# Patient Record
Sex: MALE | Race: WHITE | NOT HISPANIC OR LATINO | URBAN - METROPOLITAN AREA
[De-identification: names, ages, dates, MRNs, and addresses within clinical notes are randomized per-mention and may not be internally consistent; named-entity substitution may affect disease eponyms.]

---

## 2017-04-19 PROBLEM — Z00.00 ENCOUNTER FOR PREVENTIVE HEALTH EXAMINATION: Status: ACTIVE | Noted: 2017-04-19

## 2017-07-31 ENCOUNTER — EMERGENCY (EMERGENCY)
Facility: HOSPITAL | Age: 32
LOS: 1 days | Discharge: HOME | End: 2017-07-31
Attending: EMERGENCY MEDICINE | Admitting: EMERGENCY MEDICINE
Payer: COMMERCIAL

## 2017-07-31 VITALS
OXYGEN SATURATION: 98 % | HEART RATE: 90 BPM | SYSTOLIC BLOOD PRESSURE: 159 MMHG | RESPIRATION RATE: 18 BRPM | DIASTOLIC BLOOD PRESSURE: 81 MMHG | TEMPERATURE: 99 F

## 2017-07-31 VITALS
DIASTOLIC BLOOD PRESSURE: 84 MMHG | SYSTOLIC BLOOD PRESSURE: 128 MMHG | RESPIRATION RATE: 18 BRPM | TEMPERATURE: 98 F | HEART RATE: 64 BPM | OXYGEN SATURATION: 98 %

## 2017-07-31 PROCEDURE — 99284 EMERGENCY DEPT VISIT MOD MDM: CPT | Mod: 25

## 2017-07-31 PROCEDURE — 96374 THER/PROPH/DIAG INJ IV PUSH: CPT

## 2017-07-31 PROCEDURE — 96375 TX/PRO/DX INJ NEW DRUG ADDON: CPT

## 2017-07-31 RX ORDER — DIPHENHYDRAMINE HCL 50 MG
25 CAPSULE ORAL ONCE
Qty: 0 | Refills: 0 | Status: COMPLETED | OUTPATIENT
Start: 2017-07-31 | End: 2017-07-31

## 2017-07-31 RX ORDER — FAMOTIDINE 10 MG/ML
20 INJECTION INTRAVENOUS ONCE
Qty: 0 | Refills: 0 | Status: COMPLETED | OUTPATIENT
Start: 2017-07-31 | End: 2017-07-31

## 2017-07-31 RX ADMIN — Medication 25 MILLIGRAM(S): at 07:37

## 2017-07-31 RX ADMIN — FAMOTIDINE 20 MILLIGRAM(S): 10 INJECTION INTRAVENOUS at 07:37

## 2017-07-31 RX ADMIN — Medication 125 MILLIGRAM(S): at 07:37

## 2017-07-31 NOTE — ED PROVIDER NOTE - ENMT, MLM
Airway patent, Nasal mucosa clear. Mouth with normal mucosa. Throat has no vesicles, no oropharyngeal exudates and uvula is midline, non erythematous with mild swelling

## 2017-07-31 NOTE — ED PROVIDER NOTE - OBJECTIVE STATEMENT
Patient complaining of pruritic "hives" that started 3 days ago which has been waxing in waning throughout his trunk and extremities. He eat sushi on day of onset but this is not new. He denies a history of allergic reaction, although his children have allergies. He also complains of a transient episode of sensation in his throat around 4am today which prompted his visit. He has been taking benadryl which relieves his pruritis. Otherwise, denies shortness of breath, chest pain, wheezing, stridor, fevers, chills, neck pain, headache, changes in vision, photophobia.

## 2017-07-31 NOTE — ED PROVIDER NOTE - SKIN, MLM
Skin normal color for race, warm, dry and intact. Diffuse blanching target shaped urticaria, some coalesced throughout upper and lower ext, anterior and posterior trunk.

## 2017-07-31 NOTE — ED PROVIDER NOTE - CHPI ED SYMPTOMS NEG
no vomiting/no wheezing/no shortness of breath/no nausea/no throat itching/no swelling of face, tongue/no cough

## 2017-07-31 NOTE — ED PROVIDER NOTE - ATTENDING CONTRIBUTION TO CARE
32y Male complaining of allergic reaction complaining of pruritic "hives" for three days with throat tightness this morning, didn't take anything improved, well appearing, uvula enlarged but not red, erythematous, steroids, h1 and h2 blocker reassess, otherwise nl heent invovlment, mild allergic rash generalized.  lungs cta b/l.

## 2017-07-31 NOTE — ED ADULT NURSE REASSESSMENT NOTE - NS ED NURSE REASSESS COMMENT FT1
pt resting comfortably in stretcher. denies difficulty breathing or trouble swallowing, no respiratory distress, hoarseness, wheezing, or facial swelling present upon assessment. pt medicated with 25 mg of benadryl IVP, 20 mg pepcid IVP and 125 mg of solu medrol, tolerated well. no complaints of pain or discomfort. lungs clear bilaterally. VS stable, afebrile. will reassess pt post medication administration. call bell within reach. comfort measures provided. safety and fall precautions maintained.

## 2017-07-31 NOTE — ED ADULT NURSE NOTE - OBJECTIVE STATEMENT
allergic reaction started on Thursday after eating [place] food in a restaurant; red, itchy rash on arms and legs; took benadryl every day at home but did not seek medical care; last took 25 mg Benadryl po at home one hour prior to arrival; comes in now due to feeling his throat is tighter; no acute resp distress noted

## 2018-07-01 ENCOUNTER — EMERGENCY (EMERGENCY)
Facility: HOSPITAL | Age: 33
LOS: 1 days | Discharge: HOME | End: 2018-07-01
Attending: EMERGENCY MEDICINE | Admitting: EMERGENCY MEDICINE
Payer: COMMERCIAL

## 2018-07-01 VITALS
OXYGEN SATURATION: 100 % | TEMPERATURE: 98 F | HEART RATE: 72 BPM | SYSTOLIC BLOOD PRESSURE: 142 MMHG | DIASTOLIC BLOOD PRESSURE: 73 MMHG | RESPIRATION RATE: 18 BRPM

## 2018-07-01 LAB
ALBUMIN SERPL ELPH-MCNC: 4.8 G/DL — SIGNIFICANT CHANGE UP (ref 3.3–5)
ALP SERPL-CCNC: 77 U/L — SIGNIFICANT CHANGE UP (ref 40–120)
ALT FLD-CCNC: 26 U/L — SIGNIFICANT CHANGE UP (ref 4–41)
AST SERPL-CCNC: 19 U/L — SIGNIFICANT CHANGE UP (ref 4–40)
BASOPHILS # BLD AUTO: 0.02 K/UL — SIGNIFICANT CHANGE UP (ref 0–0.2)
BASOPHILS NFR BLD AUTO: 0.4 % — SIGNIFICANT CHANGE UP (ref 0–2)
BILIRUB SERPL-MCNC: 0.4 MG/DL — SIGNIFICANT CHANGE UP (ref 0.2–1.2)
BUN SERPL-MCNC: 11 MG/DL — SIGNIFICANT CHANGE UP (ref 7–23)
CALCIUM SERPL-MCNC: 9.5 MG/DL — SIGNIFICANT CHANGE UP (ref 8.4–10.5)
CHLORIDE SERPL-SCNC: 101 MMOL/L — SIGNIFICANT CHANGE UP (ref 98–107)
CO2 SERPL-SCNC: 25 MMOL/L — SIGNIFICANT CHANGE UP (ref 22–31)
CREAT SERPL-MCNC: 0.95 MG/DL — SIGNIFICANT CHANGE UP (ref 0.5–1.3)
EOSINOPHIL # BLD AUTO: 0.05 K/UL — SIGNIFICANT CHANGE UP (ref 0–0.5)
EOSINOPHIL NFR BLD AUTO: 1.1 % — SIGNIFICANT CHANGE UP (ref 0–6)
GLUCOSE SERPL-MCNC: 101 MG/DL — HIGH (ref 70–99)
HCT VFR BLD CALC: 43.3 % — SIGNIFICANT CHANGE UP (ref 39–50)
HGB BLD-MCNC: 15.5 G/DL — SIGNIFICANT CHANGE UP (ref 13–17)
IMM GRANULOCYTES # BLD AUTO: 0.01 # — SIGNIFICANT CHANGE UP
IMM GRANULOCYTES NFR BLD AUTO: 0.2 % — SIGNIFICANT CHANGE UP (ref 0–1.5)
LYMPHOCYTES # BLD AUTO: 1.47 K/UL — SIGNIFICANT CHANGE UP (ref 1–3.3)
LYMPHOCYTES # BLD AUTO: 32.7 % — SIGNIFICANT CHANGE UP (ref 13–44)
MCHC RBC-ENTMCNC: 28.2 PG — SIGNIFICANT CHANGE UP (ref 27–34)
MCHC RBC-ENTMCNC: 35.8 % — SIGNIFICANT CHANGE UP (ref 32–36)
MCV RBC AUTO: 78.7 FL — LOW (ref 80–100)
MONOCYTES # BLD AUTO: 0.32 K/UL — SIGNIFICANT CHANGE UP (ref 0–0.9)
MONOCYTES NFR BLD AUTO: 7.1 % — SIGNIFICANT CHANGE UP (ref 2–14)
NEUTROPHILS # BLD AUTO: 2.62 K/UL — SIGNIFICANT CHANGE UP (ref 1.8–7.4)
NEUTROPHILS NFR BLD AUTO: 58.5 % — SIGNIFICANT CHANGE UP (ref 43–77)
NRBC # FLD: 0 — SIGNIFICANT CHANGE UP
PLATELET # BLD AUTO: 157 K/UL — SIGNIFICANT CHANGE UP (ref 150–400)
PMV BLD: 11.4 FL — SIGNIFICANT CHANGE UP (ref 7–13)
POTASSIUM SERPL-MCNC: 4.6 MMOL/L — SIGNIFICANT CHANGE UP (ref 3.5–5.3)
POTASSIUM SERPL-SCNC: 4.6 MMOL/L — SIGNIFICANT CHANGE UP (ref 3.5–5.3)
PROT SERPL-MCNC: 7.6 G/DL — SIGNIFICANT CHANGE UP (ref 6–8.3)
RBC # BLD: 5.5 M/UL — SIGNIFICANT CHANGE UP (ref 4.2–5.8)
RBC # FLD: 12.2 % — SIGNIFICANT CHANGE UP (ref 10.3–14.5)
SODIUM SERPL-SCNC: 139 MMOL/L — SIGNIFICANT CHANGE UP (ref 135–145)
TROPONIN T, HIGH SENSITIVITY: < 6 NG/L — SIGNIFICANT CHANGE UP (ref ?–14)
WBC # BLD: 4.49 K/UL — SIGNIFICANT CHANGE UP (ref 3.8–10.5)
WBC # FLD AUTO: 4.49 K/UL — SIGNIFICANT CHANGE UP (ref 3.8–10.5)

## 2018-07-01 PROCEDURE — 99284 EMERGENCY DEPT VISIT MOD MDM: CPT

## 2018-07-01 PROCEDURE — 71046 X-RAY EXAM CHEST 2 VIEWS: CPT | Mod: 26

## 2018-07-01 NOTE — ED PROVIDER NOTE - MEDICAL DECISION MAKING DETAILS
marely: more than one yr hx of atypical CP unrelated to activity and no risk factors in 34 yo pt with normal ekg. will obtain troponin and if normal ,recc opt f/u

## 2018-07-01 NOTE — ED PROVIDER NOTE - OBJECTIVE STATEMENT
marely: one year or longer hx of intermittent chest tightness. had normal echo one year ago by pts hx. sx occur several times weekly and are unrelated to activity. when playing basketball for 1.5 hours sx decrease.  past few days sx constant. no shortness of breath. has mild discomfort now.   no known cardiac risk factors.  Pt states he has increasing stress at the job and feels anxious but not depressed. no hx panic attacks

## 2018-07-01 NOTE — ED ADULT NURSE NOTE - OBJECTIVE STATEMENT
patient alert ox3 came in c/o chest tightness since few days. labs done as ordered. meds given. awaiting on results.

## 2018-07-01 NOTE — ED ADULT TRIAGE NOTE - CHIEF COMPLAINT QUOTE
pt c/o chest tightness intermittently x few days, with lightheadedness and sob. denies any additional symptoms. denies medical history.

## 2018-08-03 ENCOUNTER — APPOINTMENT (OUTPATIENT)
Dept: CV DIAGNOSITCS | Facility: HOSPITAL | Age: 33
End: 2018-08-03

## 2018-08-03 ENCOUNTER — OUTPATIENT (OUTPATIENT)
Dept: OUTPATIENT SERVICES | Facility: HOSPITAL | Age: 33
LOS: 1 days | End: 2018-08-03
Payer: COMMERCIAL

## 2018-08-03 DIAGNOSIS — I25.10 ATHEROSCLEROTIC HEART DISEASE OF NATIVE CORONARY ARTERY WITHOUT ANGINA PECTORIS: ICD-10-CM

## 2018-08-03 PROCEDURE — 93320 DOPPLER ECHO COMPLETE: CPT | Mod: 26

## 2018-08-03 PROCEDURE — 93350 STRESS TTE ONLY: CPT | Mod: 26

## 2018-08-03 PROCEDURE — 93325 DOPPLER ECHO COLOR FLOW MAPG: CPT | Mod: 26

## 2018-08-03 PROCEDURE — 93325 DOPPLER ECHO COLOR FLOW MAPG: CPT

## 2018-08-03 PROCEDURE — 93351 STRESS TTE COMPLETE: CPT

## 2018-08-03 PROCEDURE — 93320 DOPPLER ECHO COMPLETE: CPT
